# Patient Record
Sex: MALE | Race: BLACK OR AFRICAN AMERICAN
[De-identification: names, ages, dates, MRNs, and addresses within clinical notes are randomized per-mention and may not be internally consistent; named-entity substitution may affect disease eponyms.]

---

## 2019-02-21 ENCOUNTER — HOSPITAL ENCOUNTER (EMERGENCY)
Dept: HOSPITAL 92 - ERS | Age: 54
Discharge: HOME | End: 2019-02-21
Payer: SELF-PAY

## 2019-02-21 DIAGNOSIS — E78.5: ICD-10-CM

## 2019-02-21 DIAGNOSIS — F17.210: ICD-10-CM

## 2019-02-21 DIAGNOSIS — I74.5: ICD-10-CM

## 2019-02-21 DIAGNOSIS — E11.9: ICD-10-CM

## 2019-02-21 DIAGNOSIS — K80.20: ICD-10-CM

## 2019-02-21 DIAGNOSIS — Z79.84: ICD-10-CM

## 2019-02-21 DIAGNOSIS — I10: ICD-10-CM

## 2019-02-21 DIAGNOSIS — R07.89: Primary | ICD-10-CM

## 2019-02-21 DIAGNOSIS — Z79.899: ICD-10-CM

## 2019-02-21 LAB
ALBUMIN SERPL BCG-MCNC: 4.5 G/DL (ref 3.5–5)
ALP SERPL-CCNC: 77 U/L (ref 40–150)
ALT SERPL W P-5'-P-CCNC: 21 U/L (ref 8–55)
ANION GAP SERPL CALC-SCNC: 17 MMOL/L (ref 10–20)
AST SERPL-CCNC: 25 U/L (ref 5–34)
BASOPHILS # BLD AUTO: 0.1 THOU/UL (ref 0–0.2)
BASOPHILS NFR BLD AUTO: 0.7 % (ref 0–1)
BILIRUB SERPL-MCNC: 0.3 MG/DL (ref 0.2–1.2)
BUN SERPL-MCNC: 14 MG/DL (ref 8.4–25.7)
CALCIUM SERPL-MCNC: 10.3 MG/DL (ref 7.8–10.44)
CHLORIDE SERPL-SCNC: 100 MMOL/L (ref 98–107)
CO2 SERPL-SCNC: 24 MMOL/L (ref 22–29)
CREAT CL PREDICTED SERPL C-G-VRATE: 0 ML/MIN (ref 70–130)
EOSINOPHIL # BLD AUTO: 0.2 THOU/UL (ref 0–0.7)
EOSINOPHIL NFR BLD AUTO: 1.1 % (ref 0–10)
GLOBULIN SER CALC-MCNC: 4.5 G/DL (ref 2.4–3.5)
GLUCOSE SERPL-MCNC: 107 MG/DL (ref 70–105)
HGB BLD-MCNC: 13.8 G/DL (ref 14–18)
LIPASE SERPL-CCNC: 37 U/L (ref 8–78)
LYMPHOCYTES # BLD: 3.5 THOU/UL (ref 1.2–3.4)
LYMPHOCYTES NFR BLD AUTO: 20.9 % (ref 21–51)
MCH RBC QN AUTO: 24.6 PG (ref 27–31)
MCV RBC AUTO: 77.6 FL (ref 78–98)
MONOCYTES # BLD AUTO: 2.2 THOU/UL (ref 0.11–0.59)
MONOCYTES NFR BLD AUTO: 13.3 % (ref 0–10)
NEUTROPHILS # BLD AUTO: 10.6 THOU/UL (ref 1.4–6.5)
NEUTROPHILS NFR BLD AUTO: 64 % (ref 42–75)
PLATELET # BLD AUTO: 283 THOU/UL (ref 130–400)
POTASSIUM SERPL-SCNC: 4.6 MMOL/L (ref 3.5–5.1)
RBC # BLD AUTO: 5.59 MILL/UL (ref 4.7–6.1)
SODIUM SERPL-SCNC: 136 MMOL/L (ref 136–145)
TROPONIN I SERPL DL<=0.01 NG/ML-MCNC: (no result) NG/ML (ref ?–0.03)
WBC # BLD AUTO: 16.5 THOU/UL (ref 4.8–10.8)

## 2019-02-21 PROCEDURE — 93005 ELECTROCARDIOGRAM TRACING: CPT

## 2019-02-21 PROCEDURE — 96360 HYDRATION IV INFUSION INIT: CPT

## 2019-02-21 PROCEDURE — 85025 COMPLETE CBC W/AUTO DIFF WBC: CPT

## 2019-02-21 PROCEDURE — 83690 ASSAY OF LIPASE: CPT

## 2019-02-21 PROCEDURE — 80053 COMPREHEN METABOLIC PANEL: CPT

## 2019-02-21 PROCEDURE — 36415 COLL VENOUS BLD VENIPUNCTURE: CPT

## 2019-02-21 PROCEDURE — 71275 CT ANGIOGRAPHY CHEST: CPT

## 2019-02-21 PROCEDURE — 84484 ASSAY OF TROPONIN QUANT: CPT

## 2019-02-21 PROCEDURE — 71045 X-RAY EXAM CHEST 1 VIEW: CPT

## 2019-02-21 NOTE — CT
CTA OF THE THORAX AND ABDOMEN UTILIZING AORTIC DISSECTION PROTOCOL AND 3D REFORMATTED IMAGIN19

 

COMPARISON:  

Prior exam dated 16.

 

FINDINGS:  

No aneurysmal dilatation is seen involving the thoracic or abdominal aorta. 

 

There is complete occlusion of the right common iliac artery which is stable to the prior exam. 

 

There is moderate to severe stenosis involving the origin of the SILVIA which is stable. There are dupli
cated renal arteries bilaterally without hemodynamically significant stenosis. There is moderate narr
owing involving the proximal aspect of the SMA which is stable to the prior exam. The celiac artery i
s widely patent. 

 

No definite central pulmonary embolus is evident. No confluent air space opacity of pleural effusion 
is noted. 

 

No definite pathologically enlarged lymph nodes are evident. 

 

There is a small cyst within the right hepatic lobe which is stable. 

 

Hypertrophy of the adrenal glands, left greater than right are similar appearing. 

 

There is layered gallstones within the gallbladder. 

 

The pancreas and spleen appear within normal limits. 

 

No hydronephrosis is evident. No free fluid or enlarged lymph nodes are evident. Small and large gasper
l are unremarkable. 

 

There are scattered degenerative and osteoarthritic change. 

 

IMPRESSION:  

1.      No aortic stenosis, occlusion or aneurysmal formation. 

2.      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 

3.      Stable moderate narrowing involving the proximal SMA.  Stable moderate to severe narrowing of
 the SILVIA. 

4.      Stable hepatic cyst.

5.      Cholelithiasis.

6.      Stable hypertrophy of the adrenal glands. 

 

POS: [facility]

## 2019-02-21 NOTE — CON
DATE OF CONSULTATION:  02/21/2019



HISTORY OF PRESENT ILLNESS:  Mr. Reyes is a 54-year-old gentleman who presents

emergency department with complaint of abdominal pain.  Since his evaluation, his

abdominal pain is completely resolved.  He has been able to tolerate a diet

throughout his pain, but says that he has not had a bowel movement in 4 days.  He

has not tried any sort of laxatives.  He has no previous history of abdominal

issues.  He has no nausea or vomiting.  He has had no fever or chills at home. 



Due to his complaints, a CT dissection protocol was performed.  This shows no

dissection or aneurysm.  In comparison to his CT scan in 2016 when he was in the

hospital, he was found to have a very similar appearing abdominal and pelvic CT

scan.  Right common iliac artery is occluded chronically.  The left common iliac

artery is significantly narrowed.  This is patent down into his femoral system. 



In evaluation of his CT scan from 2016, the femoral artery on the right,

reconstitutes via collaterals and has runoff down toward the knee.  On the left, the

superficial femoral artery is chronically occluded with collateralization via the

profunda down toward the knee. 



From a vascular standpoint, the patient complains of claudication of approximately

20 yards.  He also has rest pain.  He is much more comfortable sitting with his feet

dangling.  He has no issues with healing or tissue loss on his feet. 



PAST MEDICAL HISTORY:  

1. Hypertension-uncontrolled.

2. Diabetes mellitus-the patient does not ever check his blood sugar.



PAST SURGICAL HISTORY:  None.



CURRENT MEDICATIONS:  

1. Metformin.

2. Lisinopril/HCTZ.

3. Statin.



ALLERGIES:  NONE.



SOCIAL HISTORY:  The patient smokes a pack of cigarettes a day.



PHYSICAL EXAMINATION:

GENERAL:  This is a very comfortable appearing gentleman, resting on the stretcher

with his feet dangling. 

VITAL SIGNS:  His heart rate is 75 and regular, blood pressure 155/90. 

HEENT:  Sclerae nonicteric.  Pupils are equal and round bilaterally. 

NECK:  Supple.  He has no carotid bruit.   

CHEST:  Clear bilaterally. 

HEART:  Rhythm is regular. 

ABDOMEN:  Soft and nontender, although protuberant. 

VASCULAR:  He has palpable carotid and radial pulses bilaterally.  I cannot palpate

femoral or dorsalis pedis pulses on either side.  He has a Doppler signal that is

very soft and monophasic in both dorsalis pedis arteries bilaterally. 



ASSESSMENT AND PLAN:  I reviewed his CT angiogram series.  He really needs a formal

aorta with runoff CT angiogram, which can be done as an outpatient.  He also needs a

cardiac workup and carotid ultrasound due to his previous stroke history.  After all

this, if we can discuss appropriate vascular reconstruction at that point, we will

get him back to the office. 







Job ID:  [id number]

## 2019-02-21 NOTE — RAD
FRONTAL VIEW CHEST:

 

Date:  02/21/19 

 

COMPARISON:  

06/28/16. 

 

INDICATION:

Chest pain. 

 

FINDINGS:

Lungs are clear. No effusion or pneumothorax. Cardiac silhouette is normal in size. 

 

IMPRESSION: 

No focal consolidation. 

 

 

POS: [facility]

## 2019-02-23 NOTE — EKG
Test Reason : CHEST PAIN

Blood Pressure : ***/*** mmHG

Vent. Rate : 091 BPM     Atrial Rate : 091 BPM

   P-R Int : 126 ms          QRS Dur : 078 ms

    QT Int : 354 ms       P-R-T Axes : 024 084 050 degrees

   QTc Int : 435 ms

 

Normal sinus rhythm

No STEMI

Normal ECG

 

Confirmed by MEHRAN ORDAZ, EMI ([id number]),  SCOUT VELEZ ([id number]) on 2/23/2019 3:33:38 PM

 

Referred By:             Confirmed By:EMI LAURENT M.D.

## 2019-09-09 ENCOUNTER — HOSPITAL ENCOUNTER (OUTPATIENT)
Dept: HOSPITAL 92 - CT | Age: 54
Discharge: HOME | End: 2019-09-09
Attending: THORACIC SURGERY (CARDIOTHORACIC VASCULAR SURGERY)
Payer: COMMERCIAL

## 2019-09-09 DIAGNOSIS — Z01.818: Primary | ICD-10-CM

## 2019-09-09 DIAGNOSIS — I70.213: ICD-10-CM

## 2019-09-09 DIAGNOSIS — I74.5: ICD-10-CM

## 2019-09-09 PROCEDURE — 75635 CT ANGIO ABDOMINAL ARTERIES: CPT

## 2019-09-09 PROCEDURE — 78452 HT MUSCLE IMAGE SPECT MULT: CPT

## 2019-09-09 PROCEDURE — 93017 CV STRESS TEST TRACING ONLY: CPT

## 2019-09-09 PROCEDURE — A9500 TC99M SESTAMIBI: HCPCS

## 2019-09-09 NOTE — CT
EXAM: CTA of the abdomen, pelvis, and bilateral lower extremities 



HISTORY: Peripheral vascular disease pain and numbness in the right leg; weakness in the right leg



COMPARISON: 6/29/2016



TECHNIQUE: Multiple contiguous axial images were obtained a CTA of the abdomen, pelvis, and bilateral
 lower extremities with contrast. Sagittal and coronal 3-D MIP reformats were performed.



FINDINGS:

Liver: A small stable hypodensity in the liver likely represents a cyst..

Gallbladder: Gallstones are seen in the dependent aspect of the gallbladder.

Kidneys: There is hyperplasia of both adrenal glands, left greater than right.

Adrenal glands: Unremarkable.

Spleen: Unremarkable.

Pancreas: Unremarkable.



Bowel: Unremarkable. Normal appendix.

Reproductive organs :Unremarkable.

Retroperitoneum: No lymphadenopathy

Bones: Degenerative changes in the spine.

Inferior thorax: Unremarkable.





Abdominal aorta. Normal caliber without evidence of dissection or aneurysmal dilatation.

Celiac trunk: Patent. 

SMA: Patent. Mild atherosclerotic disease at the ostium

SILVIA: Patent. Severe atherosclerotic disease at the ostium

Renal arteries: 2 bilateral renal arteries without significant atherosclerotic disease



Bilateral common iliac arteries: Stable occlusion of the right common iliac artery. Moderate diffuse 
nonfocal atherosclerotic disease of the left common iliac artery.

Internal iliac arteries: Occluded and heavily diseased bilateral internal iliac arteries. 

External iliac arteries: Occluded right external iliac artery. Moderately diseased left external jan
c artery.



Common femoral arteries: Mildly diseased bilateral common femoral arteries. The right common femoral 
artery demonstrates flow via collaterals.

Profunda femoral arteries: Unremarkable.

Superficial femoral arteries: Severe focal atherosclerotic disease of the proximal right superficial 
femoral artery just distal to the bifurcation over a length of approximately 2.5 cm. Occlusion of

the right superficial femoral artery along the midportion. Occlusion of the left superficial femoral 
artery at its takeoff proximally. 



Popliteal arteries: Mild diffuse atherosclerotic disease bilaterally. Reconstitution of flow bilatera
lly via profunda collaterals..

Right lower extremity: Occlusion of the right peroneal artery. Moderately diseased right posterior ti
bial artery beginning along its midportion extending distally.  Moderately diseased left anterior

tibial artery proximally and moderately diseased left posterior tibial artery along the midportion. T
hree-vessel runoff is seen on the left and 2 vessel runoff on the right..

Left lower extremity: 3 vessel runoff without significant disease



IMPRESSION:

Stable atherosclerotic disease bilaterally with stable occlusion of the right common iliac artery and
 stable bilateral superficial femoral artery occlusion.



Reported By: Martinez Peña 

Electronically Signed:  9/9/2019 9:22 AM

## 2019-09-09 NOTE — NM
EXAM: Nuclear medicine cardiac perfusion examination with ejection fraction



HISTORY: Preoperative examination. History of hypertension, diabetes, and dyslipidemia



TECHNIQUE:

Rest images: 9.8 mCi technetium 99m sestamibi

Stress images: 30.7 mCi of technetium 9M sestamibi; Adenosine



COMPARISON: None



FINDINGS:

Tomographic images: No fixed or reversible perfusion defects.



Gated images: Normal wall motion and ejection fraction of 61%.



EDV: 96 mL

LHR: 0.3

TID: 1.1



IMPRESSION: No evidence of ischemia



Reported By: Martinez Peña 

Electronically Signed:  9/9/2019 12:39 PM

## 2019-09-10 NOTE — STRESS
Acquisition Time: 2019-09-09  10:52:22

Total Exercise Time: 00:04:00

Test Indications: PREOPERATIVE CLEARANCE

Medications: 

 

 

 

 

 

 

 

 

 

Protocol: ADENOSINE

Max HR: 102 BPM  61% of  Pred: 165 BPM

Max BP: 160/080 mmHG

Max Work Load: 1.0 METS

 

RESTING ECG: NORMAL SINUS RHYTHM AT 67 BPM 

SYMPTOMS: DYSPNEA ON EXERTION

NORMAL BP RESPONSE

ECTOPY: NONE

ECG STRESS: NO SIGNIFICANT CHANGES

INTERPRETATION: AWAIT NUCLEAR IMAGES FOR DEFINITIVE DIAGNOSIS

 

Confirmed by DEN GARCIA ([id number]),  DONTE WOODS ([id number]) on 9/10/2019 1:06:04 PM

 

Referred By: MD MATTHEW MARQUEZ           Confirmed By:DEN GARCIA

## 2019-09-13 ENCOUNTER — HOSPITAL ENCOUNTER (OUTPATIENT)
Dept: HOSPITAL 92 - LABBT | Age: 54
Discharge: HOME | End: 2019-09-13
Attending: THORACIC SURGERY (CARDIOTHORACIC VASCULAR SURGERY)
Payer: COMMERCIAL

## 2019-09-13 ENCOUNTER — HOSPITAL ENCOUNTER (INPATIENT)
Dept: HOSPITAL 92 - SURG A | Age: 54
LOS: 8 days | Discharge: HOME | DRG: 271 | End: 2019-09-21
Attending: THORACIC SURGERY (CARDIOTHORACIC VASCULAR SURGERY) | Admitting: THORACIC SURGERY (CARDIOTHORACIC VASCULAR SURGERY)
Payer: COMMERCIAL

## 2019-09-13 VITALS — BODY MASS INDEX: 26.7 KG/M2

## 2019-09-13 DIAGNOSIS — Z79.899: ICD-10-CM

## 2019-09-13 DIAGNOSIS — E11.51: Primary | ICD-10-CM

## 2019-09-13 DIAGNOSIS — I10: ICD-10-CM

## 2019-09-13 DIAGNOSIS — J44.9: ICD-10-CM

## 2019-09-13 DIAGNOSIS — I25.10: ICD-10-CM

## 2019-09-13 DIAGNOSIS — I70.213: ICD-10-CM

## 2019-09-13 DIAGNOSIS — Z01.810: Primary | ICD-10-CM

## 2019-09-13 DIAGNOSIS — Z79.84: ICD-10-CM

## 2019-09-13 DIAGNOSIS — I74.09: ICD-10-CM

## 2019-09-13 DIAGNOSIS — E78.2: ICD-10-CM

## 2019-09-13 DIAGNOSIS — F17.210: ICD-10-CM

## 2019-09-13 DIAGNOSIS — I73.9: ICD-10-CM

## 2019-09-13 LAB
ANION GAP SERPL CALC-SCNC: 14 MMOL/L (ref 10–20)
BUN SERPL-MCNC: 9 MG/DL (ref 8.4–25.7)
CALCIUM SERPL-MCNC: 10.2 MG/DL (ref 7.8–10.44)
CHLORIDE SERPL-SCNC: 104 MMOL/L (ref 98–107)
CO2 SERPL-SCNC: 26 MMOL/L (ref 22–29)
CREAT CL PREDICTED SERPL C-G-VRATE: 0 ML/MIN (ref 70–130)
GLUCOSE SERPL-MCNC: 73 MG/DL (ref 70–105)
HGB BLD-MCNC: 14.2 G/DL (ref 14–18)
MCH RBC QN AUTO: 24.9 PG (ref 27–31)
MCV RBC AUTO: 75.2 FL (ref 78–98)
PLATELET # BLD AUTO: 233 THOU/UL (ref 130–400)
POTASSIUM SERPL-SCNC: 4.8 MMOL/L (ref 3.5–5.1)
RBC # BLD AUTO: 5.71 MILL/UL (ref 4.7–6.1)
SODIUM SERPL-SCNC: 139 MMOL/L (ref 136–145)
WBC # BLD AUTO: 12.2 THOU/UL (ref 4.8–10.8)

## 2019-09-13 PROCEDURE — 82805 BLOOD GASES W/O2 SATURATION: CPT

## 2019-09-13 PROCEDURE — 36415 COLL VENOUS BLD VENIPUNCTURE: CPT

## 2019-09-13 PROCEDURE — 85027 COMPLETE CBC AUTOMATED: CPT

## 2019-09-13 PROCEDURE — 93005 ELECTROCARDIOGRAM TRACING: CPT

## 2019-09-13 PROCEDURE — 94640 AIRWAY INHALATION TREATMENT: CPT

## 2019-09-13 PROCEDURE — 71045 X-RAY EXAM CHEST 1 VIEW: CPT

## 2019-09-13 PROCEDURE — 86901 BLOOD TYPING SEROLOGIC RH(D): CPT

## 2019-09-13 PROCEDURE — 93010 ELECTROCARDIOGRAM REPORT: CPT

## 2019-09-13 PROCEDURE — 85025 COMPLETE CBC W/AUTO DIFF WBC: CPT

## 2019-09-13 PROCEDURE — 80048 BASIC METABOLIC PNL TOTAL CA: CPT

## 2019-09-13 PROCEDURE — 86850 RBC ANTIBODY SCREEN: CPT

## 2019-09-13 PROCEDURE — 36416 COLLJ CAPILLARY BLOOD SPEC: CPT

## 2019-09-13 PROCEDURE — P9045 ALBUMIN (HUMAN), 5%, 250 ML: HCPCS

## 2019-09-13 PROCEDURE — 36430 TRANSFUSION BLD/BLD COMPNT: CPT

## 2019-09-13 PROCEDURE — 86900 BLOOD TYPING SEROLOGIC ABO: CPT

## 2019-09-13 NOTE — EKG
Test Reason : 

Blood Pressure : ***/*** mmHG

Vent. Rate : 076 BPM     Atrial Rate : 076 BPM

   P-R Int : 156 ms          QRS Dur : 086 ms

    QT Int : 384 ms       P-R-T Axes : 076 090 044 degrees

   QTc Int : 432 ms

 

Normal sinus rhythm

Rightward axis

Borderline ECG

When compared with ECG of 21-FEB-2019 16:23,

No significant change was found

Confirmed by DR. MARINA ARMAS ([id number]) on 9/13/2019 5:01:31 PM

 

Referred By:  MELIZA           Confirmed By:DR. MARINA ARMAS

## 2019-09-16 PROCEDURE — 04100JH BYPASS ABDOMINAL AORTA TO RIGHT FEMORAL ARTERY WITH SYNTHETIC SUBSTITUTE, OPEN APPROACH: ICD-10-PCS | Performed by: THORACIC SURGERY (CARDIOTHORACIC VASCULAR SURGERY)

## 2019-09-16 PROCEDURE — 04100J7 BYPASS ABDOMINAL AORTA TO LEFT COMMON ILIAC ARTERY WITH SYNTHETIC SUBSTITUTE, OPEN APPROACH: ICD-10-PCS | Performed by: THORACIC SURGERY (CARDIOTHORACIC VASCULAR SURGERY)

## 2019-09-16 RX ADMIN — CEFAZOLIN SODIUM SCH MLS: 2 SOLUTION INTRAVENOUS at 15:08

## 2019-09-16 RX ADMIN — POTASSIUM CHLORIDE, DEXTROSE MONOHYDRATE AND SODIUM CHLORIDE SCH MLS: 150; 5; 450 INJECTION, SOLUTION INTRAVENOUS at 22:32

## 2019-09-16 RX ADMIN — VANCOMYCIN HYDROCHLORIDE SCH MLS: 1 INJECTION, POWDER, LYOPHILIZED, FOR SOLUTION INTRAVENOUS at 15:53

## 2019-09-16 RX ADMIN — POTASSIUM CHLORIDE, DEXTROSE MONOHYDRATE AND SODIUM CHLORIDE SCH MLS: 150; 5; 450 INJECTION, SOLUTION INTRAVENOUS at 15:10

## 2019-09-16 RX ADMIN — CEFAZOLIN SODIUM SCH MLS: 2 SOLUTION INTRAVENOUS at 21:32

## 2019-09-16 NOTE — OP
DATE OF PROCEDURE:  09/16/2019



PREOPERATIVE DIAGNOSIS:  Aortoiliac occlusive disease.



POSTOPERATIVE DIAGNOSIS:  Aortoiliac occlusive disease.



PROCEDURES PERFORMED:  

1. Aorto left common iliac and right common femoral/superficial femoral artery

bypass utilizing a 16 x 8 Hemashield Gold bifurcated graft. 

2. Right common femoral artery endarterectomy.



ASSISTANT SURGEON:  Elver Conde MD



ANESTHESIA:  General endotracheal - Dr. Keenan Will.



ESTIMATED BLOOD LOSS:  Minimal.



CELL SAVER:  234 mL.



DESCRIPTION OF PROCEDURE:  After consent was obtained, the patient was brought to

the operating room, placed in supine position on the operating table.  Appropriate

central line and monitors were placed and general anesthesia was induced.  Abdomen,

groins, and legs were prepped and draped in usual sterile fashion.  A skin incision

was made over the common femoral artery on the right.  Common femoral, profunda

femoris, and superficial femoral arteries are all carefully dissected free from

surrounding tissues.  A subcutaneous tunnel was created under the inguinal ligament.

 The groin was packed with Betadine-soaked gauze.  A midline abdominal incision was

made and dissection through the linea alba and peritoneum obtained with

electrocautery.  Joy and Bookwalter retractors were brought in the operative

field.  The omentum was retracted superiorly.  The omentum was carefully dissected

free from the adhesions to the white line of Toldt on the right.  Small bowel was

delivered to the right.  The duodenum was carefully dissected free from the

retroperitoneum and delivered to the right.  The retroperitoneum was entered.  The

aorta and aortic bifurcation were carefully dissected free.  The SILVIA was divided

between clips.  This was a small artery that was patent but very small on CT angio.

The patient was given 7500 units of heparin.  After 3 minutes, the aorta and right

common iliac arteries were clamped.  An incision was made in the aorta and extended

up towards the left renal vein.  The 16 x 8 graft had been preselected and it was

sewn in place with running 4-0 prolene suture.  On release of clamps, good

hemostasis.  The right limb of the graft was tunneled through the retroperitoneum

into the groin.  The left limb of the graft was cut to appropriate length and sewn

in place to the left common iliac artery.  Antegrade flow was reestablished to the

left leg.  Hemostasis was ensured with both anastomoses.  Attention was then turned

to the right groin.  Common femoral, profunda femoris branches, and the SFA were

clamped.  The SFA was soft and the incision was begun on the SFA and extended back

proximally to the common femoral artery proximal to the profunda branches.  The

profunda was back blood easily.  An endarterectomy was performed to clear the SFA

and common femoral artery of all the plaque.  The graft was cut to appropriate

length for a long patch angioplasty and sewn in place with running 5-0 Prolene

suture.  On release of the clamps in the groin, there was good backbleeding.  Artery

was flushed with heparinized saline and suture line tied.  Multiple stitches were

placed for hemostasis.  Antegrade flow was reestablished down the graft.  Protamine

was administered.  Hemostasis was ensured in the groin and the wound was packed.

Attention was returned back to the abdominal cavity.  The graft and anastomoses in

the abdomen were inspected for hemostasis.  The graft was retroperitonealized with

running 2-0 Vicryl suture.  The bowels were placed back in anatomic position and

covered with omentum.  The midline fascia was closed in a running fashion with

looped #1 PDS.  The wound was then irrigated and closed with clips.  Groin incision

was inspected, there was a great palpable pulse within the femoral artery distal to

the graft.  Wound was irrigated, closed in layers and Dermabond applied to skin.

The patient tolerated the procedure well, was awakened, extubated, and transferred

to the recovery room in stable condition. 







Job ID:  [id number]

## 2019-09-16 NOTE — CON
DATE OF CONSULTATION:  



HISTORY OF PRESENT ILLNESS:  Hemant Reyes is a 54-year-old 

gentleman, who underwent aortobifem surgery by Dr. Uzair Polo. 



He is in the PAC unit.  There are no family members. 



Having some pain, but denies any difficulty breathing.  He is extubated.



PAST MEDICAL HISTORY:  Pertinent for hypertension and diabetes.



PAST SURGICAL HISTORY:  None.



SOCIAL HISTORY:  Tobacco a pack a day.  Drinks several beers a day.



SOCIAL HISTORY:  Unremarkable.



FAMILY HISTORY:  Unremarkable.



HOME MEDICATIONS:  Includes,

1. Glyburide 5.

2. Pravachol 40.

3. Lisinopril 20.



ALLERGIES:  NONE.



REVIEW OF SYSTEMS:  Ten-point negative.  Postop in the PAC unit.



PHYSICAL EXAMINATION:

VITAL SIGNS:  Saturations are 96% on room air, pulse 70, blood pressure __________. 

CHEST:  No wheezing or crackles. 

CARDIAC:  Normal S1 and S2.  No gallops. 

ABDOMEN:  No mass.



LABORATORY DATA:  Blood sugar is 188.  His white count is 12,000, H and H 14 and 42,

and platelet count is normal.  His chemistry profile, 09/13/2019, shows normal renal

function. 



ASSESSMENT:  

1. Status post aortobifemoral surgery.

2. Diabetes.

3. Hypertension.

4. History of previous left-sided Bell's palsy.



PLAN:  Pulmonary will follow while in the ICU.  Continue supportive care, neb

treatment, PT, early ambulation. 



Consultation note, 70 minutes, 50% direct patient care.







Job ID:  [id number]

## 2019-09-16 NOTE — RAD
PORTABLE CHEST:

 

HISTORY:

Central line placement.

 

COMPARISON:

02/21/2019

 

FINDINGS:

Heart size appears borderline enlarged, considering the portable technique.  A left subclavian line h
as been placed.  The catheter tip overlies the superior vena cava.  There are no signs of pneumothora
x.  An NG tube is seen with the tip below the hemidiaphragm.  Surgical staples are seen in the abdome
n, midline.  Subsegmental atelectatic changes are seen in the lung bases.

 

IMPRESSION:

1.  Placement of a left central line.  No signs of pneumothorax.

 

2.  Subsegmental atelectasis in the lung bases.

 

POS: [facility]

## 2019-09-17 LAB
ANION GAP SERPL CALC-SCNC: 10 MMOL/L (ref 10–20)
BASOPHILS # BLD AUTO: 0.1 THOU/UL (ref 0–0.2)
BASOPHILS NFR BLD AUTO: 0.6 % (ref 0–1)
BUN SERPL-MCNC: 10 MG/DL (ref 8.4–25.7)
CALCIUM SERPL-MCNC: 8.3 MG/DL (ref 7.8–10.44)
CHLORIDE SERPL-SCNC: 107 MMOL/L (ref 98–107)
CO2 SERPL-SCNC: 24 MMOL/L (ref 22–29)
CREAT CL PREDICTED SERPL C-G-VRATE: 114 ML/MIN (ref 70–130)
EOSINOPHIL # BLD AUTO: 0.1 THOU/UL (ref 0–0.7)
EOSINOPHIL NFR BLD AUTO: 0.8 % (ref 0–10)
GLUCOSE SERPL-MCNC: 118 MG/DL (ref 70–105)
HGB BLD-MCNC: 12 G/DL (ref 14–18)
LYMPHOCYTES # BLD: 1.6 THOU/UL (ref 1.2–3.4)
LYMPHOCYTES NFR BLD AUTO: 10.8 % (ref 21–51)
MCH RBC QN AUTO: 24.2 PG (ref 27–31)
MCV RBC AUTO: 75.9 FL (ref 78–98)
MONOCYTES # BLD AUTO: 1.5 THOU/UL (ref 0.11–0.59)
MONOCYTES NFR BLD AUTO: 9.9 % (ref 0–10)
NEUTROPHILS # BLD AUTO: 11.5 THOU/UL (ref 1.4–6.5)
NEUTROPHILS NFR BLD AUTO: 77.9 % (ref 42–75)
PLATELET # BLD AUTO: 183 THOU/UL (ref 130–400)
POTASSIUM SERPL-SCNC: 3.1 MMOL/L (ref 3.5–5.1)
RBC # BLD AUTO: 4.96 MILL/UL (ref 4.7–6.1)
SODIUM SERPL-SCNC: 138 MMOL/L (ref 136–145)
WBC # BLD AUTO: 14.8 THOU/UL (ref 4.8–10.8)

## 2019-09-17 RX ADMIN — ASPIRIN 81 MG CHEWABLE TABLET SCH MG: 81 TABLET CHEWABLE at 07:53

## 2019-09-17 RX ADMIN — VANCOMYCIN HYDROCHLORIDE SCH MLS: 1 INJECTION, POWDER, LYOPHILIZED, FOR SOLUTION INTRAVENOUS at 03:39

## 2019-09-17 RX ADMIN — HYDROCODONE BITARTRATE AND ACETAMINOPHEN PRN TAB: 5; 325 TABLET ORAL at 12:49

## 2019-09-17 RX ADMIN — CEFAZOLIN SODIUM SCH MLS: 2 SOLUTION INTRAVENOUS at 05:18

## 2019-09-17 RX ADMIN — POTASSIUM CHLORIDE, DEXTROSE MONOHYDRATE AND SODIUM CHLORIDE SCH MLS: 150; 5; 450 INJECTION, SOLUTION INTRAVENOUS at 14:06

## 2019-09-17 RX ADMIN — HYDROCODONE BITARTRATE AND ACETAMINOPHEN PRN TAB: 5; 325 TABLET ORAL at 19:46

## 2019-09-17 RX ADMIN — POTASSIUM CHLORIDE, DEXTROSE MONOHYDRATE AND SODIUM CHLORIDE SCH MLS: 150; 5; 450 INJECTION, SOLUTION INTRAVENOUS at 19:45

## 2019-09-17 NOTE — PRG
DATE OF SERVICE:  09/17/2019



SUBJECTIVE:  This morning, he is awake, alert, and responsive.  He is still 
having

some pain, but no shortness of breath. 



OBJECTIVE:  VITAL SIGNS:  Pulse 93, blood pressure 120/75, respirations 18,

saturations 96%. 

CHEST:  Bilateral rhonchi. 

CARDIAC:  Normal S1 and S2.  No gallops. 

ABDOMEN:  No masses.



LABORATORY DATA:  White count 14,000.  Lytes are normal.  Potassium 3.3.  He is

still on a nitroglycerin drip, which is being weaned off. 



ASSESSMENT:  Chronic obstructive pulmonary disease, status post aortobifemoral. 



Agree with aggressive neb treatments.  PT, supportive care.  We will follow.







Job ID:  [id number]



[facility]

## 2019-09-17 NOTE — CON
DATE OF CONSULTATION:  



Mr. Reyes is a 54-year-old male, who underwent surgery with Dr. Polo yesterday

morning. 



He had an aorto left common iliac and right common femoral, superficial femoral

artery bypass using a 16 x 8 Hemashield Gold bifurcated graft.  He had a right

common femoral endarterectomy. 



He is transferred to critical care unit afterwards and is doing well postextubation.

 My partner saw this patient. 





CANCELED DICTATION







Job ID:  [id number]

## 2019-09-18 RX ADMIN — ASPIRIN 81 MG CHEWABLE TABLET SCH MG: 81 TABLET CHEWABLE at 09:06

## 2019-09-18 RX ADMIN — POTASSIUM CHLORIDE, DEXTROSE MONOHYDRATE AND SODIUM CHLORIDE SCH MLS: 150; 5; 450 INJECTION, SOLUTION INTRAVENOUS at 05:00

## 2019-09-18 RX ADMIN — HYDROCODONE BITARTRATE AND ACETAMINOPHEN PRN TAB: 5; 325 TABLET ORAL at 15:22

## 2019-09-18 RX ADMIN — HYDROCODONE BITARTRATE AND ACETAMINOPHEN PRN TAB: 5; 325 TABLET ORAL at 20:18

## 2019-09-18 RX ADMIN — HYDROCODONE BITARTRATE AND ACETAMINOPHEN PRN TAB: 5; 325 TABLET ORAL at 10:36

## 2019-09-18 RX ADMIN — HYDROCODONE BITARTRATE AND ACETAMINOPHEN PRN TAB: 5; 325 TABLET ORAL at 05:00

## 2019-09-18 NOTE — PRG
DATE OF SERVICE:  09/18/2019



SUBJECTIVE:  The patient this morning, he is better, less short of breath, less pain.



OBJECTIVE:  VITAL SIGNS:  Saturations are 93% on room air, pulse 91, respiratory

rate 16, blood pressure 120/88. 

CHEST:  No wheezing, crackles. 

CARDIAC:  Normal S1, S2.  No gallops. 

ABDOMEN:  No masses.



ASSESSMENT:  Chronic obstructive pulmonary disease, stable.



PLAN:  Continue present treatment.  Will be transferred out of the ICU.







Job ID:  [id number]

## 2019-09-19 RX ADMIN — ASPIRIN 81 MG CHEWABLE TABLET SCH MG: 81 TABLET CHEWABLE at 08:19

## 2019-09-19 NOTE — PRG
DATE OF SERVICE:  09/19/2019



SUBJECTIVE:  This morning, he is doing well, less pain, less shortness of breath.



OBJECTIVE:  VITAL SIGNS:  Saturations are 90% on room air, respiratory rate 14,

temperature 98, blood pressure 130/88. 

CHEST:  No wheezing. 

CARDIAC:  Normal S1 and S2.  No gallops. 

ABDOMEN:  No masses.



ASSESSMENT:  Chronic obstructive pulmonary disease, status post aortobifem.



PLAN:  Continue present neb treatment, PT, supportive care.  Disposition as per

Surgery. 







Job ID:  [id number]

## 2019-09-20 RX ADMIN — ASPIRIN 81 MG CHEWABLE TABLET SCH MG: 81 TABLET CHEWABLE at 09:44

## 2019-09-20 RX ADMIN — HYDROCODONE BITARTRATE AND ACETAMINOPHEN PRN TAB: 5; 325 TABLET ORAL at 10:02

## 2019-09-20 NOTE — PRG
DATE OF SERVICE:  09/20/2019



SUBJECTIVE:  This is a 54-year-old gentleman, status post aortobifem who is doing

well. 



OBJECTIVE:  VITAL SIGNS:  Blood pressure 146/77, saturations are 100% on room air,

pulse 16, temperature 98. 

CHEST:  No wheezing, crackles. 

CARDIAC:  Normal S1, S2.  No gallops.



ASSESSMENT AND PLAN:  Status post aortobifem, chronic obstructive pulmonary disease

stable.  Disposition as per Surgery.  Pulmonary will follow at a distance. 







Job ID:  [id number]

## 2019-09-21 VITALS — TEMPERATURE: 97.6 F | DIASTOLIC BLOOD PRESSURE: 73 MMHG | SYSTOLIC BLOOD PRESSURE: 149 MMHG

## 2019-09-21 RX ADMIN — ASPIRIN 81 MG CHEWABLE TABLET SCH MG: 81 TABLET CHEWABLE at 09:05

## 2019-09-22 NOTE — DIS
DATE OF ADMISSION:  09/16/2019



DATE OF DISCHARGE:  09/20/2019



DIAGNOSES:  Peripheral vascular disease status post aorto right common femoral, left

common iliac artery bypass. 



DESCRIPTION OF HOSPITAL STAY:  Mr. Reyes was admitted electively by iliac/femoral

bypass.  He has done well postoperatively.  Currently, he is ambulatory, tolerating

regular diet, having good bowel and bladder function.  Incision is clean and dry

without evidence of infection. 



DISCHARGE MEDICATIONS:  Include:

1. Aspirin 81 mg daily.

2. Glyburide 5 mg at bedtime.

3. Lisinopril 20 mg daily.

4. Pravachol 40 mg daily.

5. Norco 5/325 one to two q.6 hours p.r.n. pain.



FOLLOWUP:  Follow up with me in 2 weeks.







Job ID:  [id number]

## 2019-09-24 LAB
ANALYZER IN CARDIO: (no result)
ANALYZER IN CARDIO: (no result)
BASE EXCESS STD BLDA CALC-SCNC: -3.5 MEQ/L
BASE EXCESS STD BLDA CALC-SCNC: -4.2 MEQ/L
CA-I BLDA-SCNC: 1.07 MMOL/L (ref 1.12–1.3)
CA-I BLDA-SCNC: 1.11 MMOL/L (ref 1.12–1.3)
HCO3 BLDA-SCNC: 20.6 MEQ/L (ref 22–28)
HCO3 BLDA-SCNC: 21.1 MEQ/L (ref 22–28)
HCT VFR BLDA CALC: 32 % (ref 42–52)
HCT VFR BLDA CALC: 36 % (ref 42–52)
HGB BLDA-MCNC: 10.8 G/DL (ref 14–18)
HGB BLDA-MCNC: 12.1 G/DL (ref 14–18)
PCO2 BLDA: 34.2 MMHG (ref 35–45)
PCO2 BLDA: 39.4 MMHG (ref 35–45)
PH BLDA: 7.35 [PH] (ref 7.35–7.45)
PH BLDA: 7.4 [PH] (ref 7.35–7.45)
PO2 BLDA: 317.1 MMHG (ref 80–100)
PO2 BLDA: 519 MMHG (ref 80–100)
PO2 BLDA: 537.1 MMHG (ref 80–100)
POTASSIUM BLD-SCNC: 2.89 MMOL/L (ref 3.7–5.3)
POTASSIUM BLD-SCNC: 3.41 MMOL/L (ref 3.7–5.3)
SPECIMEN DRAWN FROM PATIENT: (no result)
SPECIMEN DRAWN FROM PATIENT: (no result)

## 2020-02-03 ENCOUNTER — HOSPITAL ENCOUNTER (EMERGENCY)
Dept: HOSPITAL 92 - ERS | Age: 55
Discharge: HOME | End: 2020-02-03
Payer: COMMERCIAL

## 2020-02-03 DIAGNOSIS — Z79.899: ICD-10-CM

## 2020-02-03 DIAGNOSIS — L97.519: ICD-10-CM

## 2020-02-03 DIAGNOSIS — Z79.84: ICD-10-CM

## 2020-02-03 DIAGNOSIS — E78.5: ICD-10-CM

## 2020-02-03 DIAGNOSIS — F17.210: ICD-10-CM

## 2020-02-03 DIAGNOSIS — I10: ICD-10-CM

## 2020-02-03 DIAGNOSIS — E11.621: Primary | ICD-10-CM

## 2020-02-03 PROCEDURE — 99282 EMERGENCY DEPT VISIT SF MDM: CPT

## 2020-07-09 ENCOUNTER — HOSPITAL ENCOUNTER (EMERGENCY)
Dept: HOSPITAL 92 - ERS | Age: 55
Discharge: HOME | End: 2020-07-09
Payer: COMMERCIAL

## 2020-07-09 DIAGNOSIS — Z79.899: ICD-10-CM

## 2020-07-09 DIAGNOSIS — Z20.828: ICD-10-CM

## 2020-07-09 DIAGNOSIS — Z79.84: ICD-10-CM

## 2020-07-09 DIAGNOSIS — E78.5: ICD-10-CM

## 2020-07-09 DIAGNOSIS — F17.210: ICD-10-CM

## 2020-07-09 DIAGNOSIS — E11.9: ICD-10-CM

## 2020-07-09 DIAGNOSIS — M54.5: Primary | ICD-10-CM

## 2020-07-09 DIAGNOSIS — I10: ICD-10-CM

## 2020-07-09 PROCEDURE — 87635 SARS-COV-2 COVID-19 AMP PRB: CPT

## 2020-07-09 PROCEDURE — U0003 INFECTIOUS AGENT DETECTION BY NUCLEIC ACID (DNA OR RNA); SEVERE ACUTE RESPIRATORY SYNDROME CORONAVIRUS 2 (SARS-COV-2) (CORONAVIRUS DISEASE [COVID-19]), AMPLIFIED PROBE TECHNIQUE, MAKING USE OF HIGH THROUGHPUT TECHNOLOGIES AS DESCRIBED BY CMS-2020-01-R: HCPCS

## 2020-07-09 PROCEDURE — 96374 THER/PROPH/DIAG INJ IV PUSH: CPT
